# Patient Record
(demographics unavailable — no encounter records)

---

## 2024-10-09 NOTE — HISTORY OF PRESENT ILLNESS
[de-identified] : Hailey Allison is a 68 yo female with hx bilateral hearing loss, currently wearing hearing aids, who presents for diminished hearing in the left ear. She notes some left aural fullness. She denies otalgia and otorrhea. She denies tinnitus or vertigo. She has history of allergic rhinitis. She has been using Neilmed sinus rinses and flonase mist. She notes chronic sinus pressure and some postnasal secretions.  She notes improvement in symptoms since using rinses. She states that she has not had allergy testing in the past. She denies recurrent sinus infections or sinus surgery in the past. She denies recent fevers and chills. [FreeTextEntry1] : 10/9/24 - Ms. Allison presents for follow-up. She notes sinus pressure and postnasal drainage. She denies sinus pressure or rhinorrhea. She denies hearing change, tinnitus. She notes some lightheadedness but denies vertigo. She denies otalgia or otorrhea. She denies recent fevers.

## 2024-10-09 NOTE — ASSESSMENT
[FreeTextEntry1] : Hailey Allison presents for follow-up. She has history of bilateral hearing loss and wears hearing aids. She has chronic rhinitis and previous CT sinus showing septal deviation but no sinus disease. She notes acute sinusitis symptoms. Exam is wnl today. Will start treatment for sinusitis. She will need new audio at next visit. she notes recent head trauma without loss of consciousness. Advised she follow up with her PCP for evaluation for concussion protocol.   - start augmentin x 10 days. Side effects were discussed and include but are not limited to nausea, vomiting, diarrhea, and skin rash. - start nasal saline sprays TID x 3 weeks. - start fluticasone 2 sprays BID x 3 weeks. - f/u in 1 mo w/ audio and HAC.

## 2024-11-02 NOTE — ASSESSMENT
[FreeTextEntry1] : Venipuncture with labs done in office today, 02:56 PM 10/30/2024 to monitor Hailey's kidney function.  Dr. Polk's assessment: Hailey is suffering from headache secondary to HTN.   Dr. Polk's recommendation: Hailey should begin a blood pressure logbook and take her blood pressure once a day prior to dinner. Hailey should begin taking losartan 50 mg once a day in the morning..  Hailey should follow up with Dr. Srivastava in 1 month and bring her logbook with her.

## 2024-11-02 NOTE — HISTORY OF PRESENT ILLNESS
[FreeTextEntry8] : NADIR FOFANA is a 69 year old female, with history of CAD and cough, who presents to the office for follow up evaluation. Nadir complains of chronic elevated blood pressure tied to painful headaches.

## 2024-11-02 NOTE — ADDENDUM
[FreeTextEntry1] : I, Martin Candi, acted solely as a scribe for Dr. Idalmis Polk D.O. on this date 10/30/2024.   All medical record entries made by the Scribe were at my, Dr. Idalmis Polk D.O., direction and personally dictated by me on 10/30/2024. I have reviewed the chart and agree that the record accurately reflects my personal performance of the history, physical exam, assessment and plan. I have also personally directed, reviewed, and agreed with the chart.

## 2024-12-16 NOTE — PHYSICAL EXAM
[No Acute Distress] : no acute distress [Normal Oropharynx] : normal oropharynx [Normal Appearance] : normal appearance [No Neck Mass] : no neck mass [Normal Rate/Rhythm] : normal rate/rhythm [Normal S1, S2] : normal s1, s2 [No Murmurs] : no murmurs [No Resp Distress] : no resp distress [Clear to Auscultation Bilaterally] : clear to auscultation bilaterally [No Abnormalities] : no abnormalities [Benign] : benign [Normal Gait] : normal gait [No Clubbing] : no clubbing [No Cyanosis] : no cyanosis [No Edema] : no edema [FROM] : FROM [Normal Color/ Pigmentation] : normal color/ pigmentation [No Focal Deficits] : no focal deficits [Oriented x3] : oriented x3 [Normal Affect] : normal affect [TextBox_2] : Not orthostatic [TextBox_80] : Breast exam negative.

## 2024-12-16 NOTE — HISTORY OF PRESENT ILLNESS
[TextBox_4] : Came in in oct and started losartan 50 mg and has been taking b/p at home 128/78, good in office last spring tested positive for Lyme after tick and was treated.  wants to get retested daughters dog bite her and she got a tetanus shot and got antibiotics Since last visit had ultrasound of carotid, found mild calcified plaque in both carotid bulbs also went for a Ct calcium score   refuses shingles shot uses Taltz monthly via rheum.  using Flonase for sinus congestion  saw cardio over 3 year ago had Holter and stress test negative.

## 2024-12-16 NOTE — PROCEDURE
[FreeTextEntry1] : 4/2023-Bone mineral density reveals osteoporosis of the lumbosacral spine without significant change compared to October 2020. There is osteopenia of the left hip which is increased compared to 2018.  venipuncture in office   EKG NSR without significant change.

## 2024-12-16 NOTE — DISCUSSION/SUMMARY
[FreeTextEntry1] : S/P Lyme by history.  Told of elevated titers but blood drawn 2 days after tick bite.  Treated for 7 days. Mild Carotid Disease Osteoporosis has seen rheumatology.  Hyperlipidemia on treatment protocol May benefit from increase in statin. Continue present therapy GERD Sciatica Psoriatic arthritis on therapy.

## 2024-12-16 NOTE — REASON FOR VISIT
[Follow-Up] : a follow-up visit [Abnormal CXR/ Chest CT] : an abnormal CXR/ chest CT [TextBox_44] : HTN

## 2024-12-16 NOTE — ASSESSMENT
[FreeTextEntry1] : Labs drawn in office today Rheumatology follow-up regarding osteoporosis. Medications reviewed and renewed. Repeat Lyme titers may need ID evaluation Repeat lipids may benefit increase in statin. flu shot given PE april.    35 minutes spent in evaluation and management.

## 2025-04-29 NOTE — PROCEDURE
[FreeTextEntry1] : BMD see attached.  Improved. venipuncture in office   EKG NSR without significant change.

## 2025-04-29 NOTE — DISCUSSION/SUMMARY
[FreeTextEntry1] : Mild Carotid Disease Osteoporosis has seen rheumatology.  Hyperlipidemia on treatment protocol including medications, diet, and exercise program as tolerated. Continue present therapy GERD controlled. Sciatica Psoriatic arthritis on therapy.

## 2025-04-29 NOTE — HISTORY OF PRESENT ILLNESS
[TextBox_4] : Feeling well. Positive exercising.  GYN Y Colonoscopy 04/2023 Derm N Optho Y BD 4/2023  refuses shingles shot uses Taltz monthly via rheum.  using Flonase for sinus congestion occasionally   saw cardio over 3 year ago had Holter and stress test negative.

## 2025-04-29 NOTE — ASSESSMENT
[FreeTextEntry1] : Labs drawn in office today Calcium/VITamin D/Exercise as treatment for bone loss discussed. Rheumatology follow-up regarding treatment for osteopenia/osteoporosis.. Medications reviewed and renewed. Urged to go skin survey   35 minutes spent in evaluation and management.

## 2025-04-29 NOTE — PHYSICAL EXAM
[No Acute Distress] : no acute distress [Normal Oropharynx] : normal oropharynx [Normal Appearance] : normal appearance [No Neck Mass] : no neck mass [Normal Rate/Rhythm] : normal rate/rhythm [Normal S1, S2] : normal s1, s2 [No Murmurs] : no murmurs [No Resp Distress] : no resp distress [Clear to Auscultation Bilaterally] : clear to auscultation bilaterally [No Abnormalities] : no abnormalities [Benign] : benign [Normal Gait] : normal gait [No Clubbing] : no clubbing [No Cyanosis] : no cyanosis [No Edema] : no edema [Normal Color/ Pigmentation] : normal color/ pigmentation [No Focal Deficits] : no focal deficits [Oriented x3] : oriented x3 [Normal Affect] : normal affect [Normal to Percussion] : normal to percussion [TextBox_2] : Not orthostatic [TextBox_80] : Breast exam negative.

## 2025-05-02 NOTE — PHYSICAL EXAM
[General Appearance - Alert] : alert [General Appearance - In No Acute Distress] : in no acute distress [General Appearance - Well Nourished] : well nourished [General Appearance - Well-Appearing] : healthy appearing [Sclera] : the sclera and conjunctiva were normal [PERRL With Normal Accommodation] : pupils were equal in size, round, reactive to light [Extraocular Movements] : extraocular movements were intact [Outer Ear] : the ears and nose were normal in appearance [Neck Appearance] : the appearance of the neck was normal [Exaggerated Use Of Accessory Muscles For Inspiration] : no accessory muscle use [Auscultation Breath Sounds / Voice Sounds] : lungs were clear to auscultation bilaterally [Heart Rate And Rhythm] : heart rate was normal and rhythm regular [Heart Sounds] : normal S1 and S2 [Bowel Sounds] : normal bowel sounds [Abdomen Soft] : soft [Cervical Lymph Nodes Enlarged Posterior Bilaterally] : posterior cervical [Cervical Lymph Nodes Enlarged Anterior Bilaterally] : anterior cervical [Supraclavicular Lymph Nodes Enlarged Bilaterally] : supraclavicular [] : no rash [FreeTextEntry1] : skin tags noted, checked for ticks in hair - no obvious or large ticks [No Focal Deficits] : no focal deficits [Oriented To Time, Place, And Person] : oriented to person, place, and time [Affect] : the affect was normal

## 2025-05-02 NOTE — ASSESSMENT
[FreeTextEntry1] : 70 F psoriatic arhthrtis, HTN, HLD presents today for lyme disease. Lyme C6 positive. Though confirmatory test was negative and pcr test was negative. She has exposure to deer though and i think she should be treated. She saw a tick on her last year attached to her neck.   Will give doxy 100 mg po BID x 10 days. Advised to avoid calcium/magnesium and dairy products. Avoid sun while on doxy - wear sunscreen and hat if in sun long time.  RTC 1 month for f/up.  No need to repeat Lyme c6 again, b/c there is no test of cure and I do not expect it to turn negative.  She will avoid feeding the deer by her home.

## 2025-05-02 NOTE — HISTORY OF PRESENT ILLNESS
[FreeTextEntry1] : 70 F psoriatic arhthrtis, HTN, HLD presents today for lyme disease.  Lives in [facility] and [place].  She feeds them food from her hands. Has had a tick on her in the past. Most recently her joints have been hurting more. Not sure it is the PA. Gets on off and chills. Didn't see bulls eye rash.  Sees Dr. Abby Patterson for rheum. Was given doxy but didn't finish it. Repeated lab and titer still high.

## 2025-05-02 NOTE — CONSULT LETTER
[Dear  ___] : Dear  [unfilled], [Consult Letter:] : I had the pleasure of evaluating your patient, [unfilled]. [Please see my note below.] : Please see my note below. [Consult Closing:] : Thank you very much for allowing me to participate in the care of this patient.  If you have any questions, please do not hesitate to contact me. [Sincerely,] : Sincerely, [FreeTextEntry2] : Dr. Tommy Srivastava [FreeTextEntry3] :  Tresa Mahan MD  of Medicine Division of Infectious Diseases [facility] [employer] [employer] [address] [place] [address] Tel: [phone] Fax: [phone]

## 2025-06-24 NOTE — PHYSICAL EXAM
[Normal] : mucosa is normal [Midline] : trachea located in midline position [de-identified] : CANAL STENOSIS

## 2025-06-24 NOTE — PHYSICAL EXAM
[Normal] : mucosa is normal [Midline] : trachea located in midline position [de-identified] : CANAL STENOSIS

## 2025-06-24 NOTE — REASON FOR VISIT
[Subsequent Evaluation] : a subsequent evaluation for [FreeTextEntry2] : hearing aid clearance and hearing test